# Patient Record
Sex: MALE | Race: WHITE | NOT HISPANIC OR LATINO | Employment: OTHER | URBAN - METROPOLITAN AREA
[De-identification: names, ages, dates, MRNs, and addresses within clinical notes are randomized per-mention and may not be internally consistent; named-entity substitution may affect disease eponyms.]

---

## 2018-01-30 ENCOUNTER — TELEPHONE (OUTPATIENT)
Dept: SURGERY | Facility: CLINIC | Age: 63
End: 2018-01-30

## 2018-01-30 NOTE — TELEPHONE ENCOUNTER
Spoke with pt and he stated he has all records and CD's. Pt wanted a sooner appt because he feels he needs to be seen asap due to rapid health decline. Appt made for tomorrow w/Dr. Valdes.

## 2018-01-30 NOTE — TELEPHONE ENCOUNTER
----- Message from Navdeep Holley sent at 1/30/2018 10:10 AM CST -----  Contact: Pt:[phone]  Pt called to schedule an appointment with  for 02/08. Pt states he was referred to  by  in [place] . Pt states he has colon cancer and a huge tumor that is causing issues and he wanted to get in as soon as possible. I did place pt on wait list.

## 2018-01-31 ENCOUNTER — OFFICE VISIT (OUTPATIENT)
Dept: SURGERY | Facility: CLINIC | Age: 63
End: 2018-01-31
Payer: MEDICARE

## 2018-01-31 VITALS — HEART RATE: 72 BPM | WEIGHT: 187.19 LBS | SYSTOLIC BLOOD PRESSURE: 161 MMHG | DIASTOLIC BLOOD PRESSURE: 93 MMHG

## 2018-01-31 DIAGNOSIS — C18.6 CANCER OF DESCENDING COLON METASTATIC TO INTRA-ABDOMINAL LYMPH NODE: ICD-10-CM

## 2018-01-31 DIAGNOSIS — D49.0 COLORECTAL NEOPLASM: ICD-10-CM

## 2018-01-31 DIAGNOSIS — C77.2 CANCER OF DESCENDING COLON METASTATIC TO INTRA-ABDOMINAL LYMPH NODE: ICD-10-CM

## 2018-01-31 DIAGNOSIS — C18.7 MALIGNANT NEOPLASM OF SIGMOID COLON: ICD-10-CM

## 2018-01-31 PROCEDURE — 99999 PR PBB SHADOW E&M-EST. PATIENT-LVL III: CPT | Mod: PBBFAC,,, | Performed by: COLON & RECTAL SURGERY

## 2018-01-31 PROCEDURE — 99205 OFFICE O/P NEW HI 60 MIN: CPT | Mod: S$PBB,,, | Performed by: COLON & RECTAL SURGERY

## 2018-01-31 PROCEDURE — 99213 OFFICE O/P EST LOW 20 MIN: CPT | Mod: PBBFAC | Performed by: COLON & RECTAL SURGERY

## 2018-01-31 RX ORDER — LISINOPRIL 20 MG/1
20 TABLET ORAL NIGHTLY
COMMUNITY

## 2018-01-31 NOTE — PROGRESS NOTES
Patient ID:  Valdemar Armstrong is a 62 y.o. male     Chief Complaint:   Chief Complaint   Patient presents with    Colon Cancer        HPI: In 2013 had obstructing sigmoid cancer. HAd resection and colostomy followed by 8 months of chemotherapy. Subsequently had colostomy takedown.   In Feb 2017 had a recurrence  That led to colonic stent followed by a small bowel obstruction and a subsequent small bowel bypass with low volume small bowel leak that has essentially resolved.    Has moved from [place] to [facility] to live with relative. Is scheduled forPETCT here at [facility] and wishes to transfer care to [facility].    ROS:        Constitutional: No fever, chills, activity or appetite change.      HENT: No hearing loss, facial swelling, neck pain or stiffness.       Eyes: No discharge, itching and visual disturbance.      Respiratory: No apnea, cough, choking or shortness of breath.       Cardiovascular: No leg swelling or chest pain      Gastrointestinal: No abdominal distention or change in bowel habbits     Genitourinary: No dysuria, frequency or flank pain.      Musculoskeletal: No arthralgias or gait problem.      Neurological: No dizziness, seizures or weakness.      Hematological: No adenopathy.      Psychiatric/Behavioral: No hallucinations or behavioral problems.       PE:    APPEARANCE: Well nourished, well developed, in no acute distress.   CHEST: Lungs clear. Normal respiratory effort.  CARDIOVASCULAR: Normal rhythm. No edema.  ABDOMEN: Soft. Midline incision with small 2 mm wound without drainage    Rectum:  Normal skin, NST, no masses or tenderness    Musculoskeletal: Symmetric, normal range of motion and strength.   Neurological: Alert and oriented to person, place, and time. Normal reflexes.   Skin: Skin is warm and dry.   Psychiatric: Normal mood and affect. Behavior is normal and appropriate.     Impression: Recurrent colon cancer    Encounter Diagnoses   Name Primary?    Colorectal neoplasm      Malignant neoplasm of sigmoid colon         PLAN: PETCT. Will review records and scan then discuss with Oncology. Will call patient with results and recommendations.

## 2018-02-05 ENCOUNTER — TELEPHONE (OUTPATIENT)
Dept: SURGERY | Facility: CLINIC | Age: 63
End: 2018-02-05

## 2018-02-05 NOTE — TELEPHONE ENCOUNTER
----- Message from Kori Méndez sent at 2/5/2018  9:14 AM CST -----  Contact: [phone] -self   Lucio - calling to schedule surgery - states needs note from dr burdick to join the [facility] for 30 days free -  please call patient at

## 2018-02-05 NOTE — TELEPHONE ENCOUNTER
Spoke with pt and he wanted an appt for after his PET scan. I informed him that Dr. Valdes will call him with the results.

## 2018-02-06 ENCOUNTER — TELEPHONE (OUTPATIENT)
Dept: SPORTS MEDICINE | Facility: CLINIC | Age: 63
End: 2018-02-06

## 2018-02-06 DIAGNOSIS — R53.81 PHYSICAL DECONDITIONING: Primary | ICD-10-CM

## 2018-02-08 ENCOUNTER — HOSPITAL ENCOUNTER (OUTPATIENT)
Dept: RADIOLOGY | Facility: HOSPITAL | Age: 63
Discharge: HOME | End: 2018-02-08
Attending: COLON & RECTAL SURGERY
Payer: MEDICARE

## 2018-02-08 DIAGNOSIS — D49.0 COLORECTAL NEOPLASM: ICD-10-CM

## 2018-02-08 DIAGNOSIS — C18.7 MALIGNANT NEOPLASM OF SIGMOID COLON: ICD-10-CM

## 2018-02-08 LAB — POCT GLUCOSE: 104 MG/DL (ref 70–110)

## 2018-02-08 PROCEDURE — 78815 PET IMAGE W/CT SKULL-THIGH: CPT | Mod: TC

## 2018-02-08 PROCEDURE — 78815 PET IMAGE W/CT SKULL-THIGH: CPT | Mod: 26,PS,, | Performed by: RADIOLOGY

## 2018-02-08 PROCEDURE — A9552 F18 FDG: HCPCS

## 2018-02-09 ENCOUNTER — TELEPHONE (OUTPATIENT)
Dept: SURGERY | Facility: CLINIC | Age: 63
End: 2018-02-09

## 2018-02-09 NOTE — TELEPHONE ENCOUNTER
Spoke with patient regarding his results per Dr. Valdes. He would like Dr. Valdes to confer with his doctor in [place] regarding the liver mets.

## 2018-02-09 NOTE — TELEPHONE ENCOUNTER
----- Message from Navdeep Holley sent at 2/9/2018  8:59 AM CST -----  Contact: Pt:[phone]  Pt called and states she would like to speak with the nurse to go over his test results. Pt states he would like to know if there going to do surgery.

## 2018-02-12 ENCOUNTER — TELEPHONE (OUTPATIENT)
Dept: SURGERY | Facility: CLINIC | Age: 63
End: 2018-02-12

## 2018-02-12 ENCOUNTER — PATIENT MESSAGE (OUTPATIENT)
Dept: SURGERY | Facility: CLINIC | Age: 63
End: 2018-02-12

## 2018-02-12 NOTE — TELEPHONE ENCOUNTER
----- Message from Navdeep Holley sent at 2/12/2018 12:14 PM CST -----  Contact: Pt:[phone]  Pt called and states he would like to have his script for his chemo sent to his doctor in [place].Pt states if he needs to see  before leaving which he states he is leaving on Friday 02/16. Pt states the nurse can give him a call at the number above.        Pt Doctor  Ph:[phone]  Fax:[phone]

## 2018-02-14 ENCOUNTER — OFFICE VISIT (OUTPATIENT)
Dept: SURGERY | Facility: CLINIC | Age: 63
End: 2018-02-14
Payer: MEDICARE

## 2018-02-14 VITALS — SYSTOLIC BLOOD PRESSURE: 122 MMHG | WEIGHT: 179 LBS | HEART RATE: 76 BPM | DIASTOLIC BLOOD PRESSURE: 81 MMHG

## 2018-02-14 DIAGNOSIS — C18.6 CANCER OF DESCENDING COLON METASTATIC TO INTRA-ABDOMINAL LYMPH NODE: Primary | ICD-10-CM

## 2018-02-14 DIAGNOSIS — C77.2 CANCER OF DESCENDING COLON METASTATIC TO INTRA-ABDOMINAL LYMPH NODE: Primary | ICD-10-CM

## 2018-02-14 PROCEDURE — 99999 PR PBB SHADOW E&M-EST. PATIENT-LVL II: CPT | Mod: PBBFAC,,, | Performed by: COLON & RECTAL SURGERY

## 2018-02-14 PROCEDURE — 99212 OFFICE O/P EST SF 10 MIN: CPT | Mod: PBBFAC | Performed by: COLON & RECTAL SURGERY

## 2018-02-14 PROCEDURE — 99214 OFFICE O/P EST MOD 30 MIN: CPT | Mod: S$PBB,,, | Performed by: COLON & RECTAL SURGERY

## 2018-02-14 NOTE — PROGRESS NOTES
Patient ID:  Valdemar Armstrong is a 62 y.o. male     Chief Complaint:   Chief Complaint   Patient presents with    Follow-up        HPI: PET Scan: dome of the liver anteriorly there are 2 hypermetabolic metastatic foci SUV max 5.81. There is a stent crossing a neoplastic stricture of the sigmoid colon SUV max 7.48. There is inflammatory activity from postsurgical change of the anterior abdomen. There is left renal atrophy and hydronephrosis.    CAse discussed at MDT recommended preop chemo then consideration of resection     In 2013 had obstructing sigmoid cancer. HAd resection and colostomy followed by 8 months of chemotherapy. Subsequently had colostomy takedown.   In Feb 2017 had a recurrence  That led to colonic stent followed by a small bowel obstruction and a subsequent small bowel bypass with low volume small bowel leak that has essentially resolved.    Has moved from [place] to [facility] to live with relative. Is scheduled forPETCT here at [facility] and wishes to transfer care to [facility].    ROS:        Constitutional: No fever, chills, activity or appetite change.      HENT: No hearing loss, facial swelling, neck pain or stiffness.       Eyes: No discharge, itching and visual disturbance.      Respiratory: No apnea, cough, choking or shortness of breath.       Cardiovascular: No leg swelling or chest pain      Gastrointestinal: No abdominal distention or change in bowel habbits     Genitourinary: No dysuria, frequency or flank pain.      Musculoskeletal: No arthralgias or gait problem.      Neurological: No dizziness, seizures or weakness.      Hematological: No adenopathy.      Psychiatric/Behavioral: No hallucinations or behavioral problems.       PE:    APPEARANCE: Well nourished, well developed, in no acute distress.   CHEST: Lungs clear. Normal respiratory effort.  CARDIOVASCULAR: Normal rhythm. No edema.  ABDOMEN: Soft. Midline incision with small 2 mm wound without drainage    Rectum:  Normal skin,  NST, no masses or tenderness    Musculoskeletal: Symmetric, normal range of motion and strength.   Neurological: Alert and oriented to person, place, and time. Normal reflexes.   Skin: Skin is warm and dry.   Psychiatric: Normal mood and affect. Behavior is normal and appropriate.     Impression: Recurrent colon cancer, liver met        PLAN: Chemotherapy then considertion of resection. Patient desires to get chemo in [place].

## 2018-02-20 ENCOUNTER — PATIENT MESSAGE (OUTPATIENT)
Dept: SURGERY | Facility: CLINIC | Age: 63
End: 2018-02-20

## 2018-04-01 ENCOUNTER — PATIENT MESSAGE (OUTPATIENT)
Dept: SURGERY | Facility: CLINIC | Age: 63
End: 2018-04-01

## 2018-04-19 ENCOUNTER — PATIENT MESSAGE (OUTPATIENT)
Dept: SURGERY | Facility: CLINIC | Age: 63
End: 2018-04-19

## 2018-04-20 ENCOUNTER — TELEPHONE (OUTPATIENT)
Dept: SURGERY | Facility: CLINIC | Age: 63
End: 2018-04-20

## 2018-04-20 NOTE — TELEPHONE ENCOUNTER
----- Message from Elizabeth Madrid sent at 4/20/2018  3:24 PM CDT -----  Contact: Self- [phone]  Lucio- pt states he is returning a missed call from Dr. Valdes- please call pt back at [phone]

## 2018-05-02 ENCOUNTER — PATIENT MESSAGE (OUTPATIENT)
Dept: SURGERY | Facility: CLINIC | Age: 63
End: 2018-05-02

## 2018-05-06 ENCOUNTER — PATIENT MESSAGE (OUTPATIENT)
Dept: SURGERY | Facility: CLINIC | Age: 63
End: 2018-05-06

## 2018-05-11 ENCOUNTER — TELEPHONE (OUTPATIENT)
Dept: SURGERY | Facility: CLINIC | Age: 63
End: 2018-05-11

## 2018-05-11 NOTE — TELEPHONE ENCOUNTER
Spoke with patient and he informed me that he had not heard back from Dr. Valdes in regards to meeting about his care.  I will talk to him today.

## 2018-05-14 ENCOUNTER — PATIENT MESSAGE (OUTPATIENT)
Dept: SURGERY | Facility: CLINIC | Age: 63
End: 2018-05-14

## 2018-05-21 ENCOUNTER — TELEPHONE (OUTPATIENT)
Dept: SURGERY | Facility: CLINIC | Age: 63
End: 2018-05-21

## 2018-05-21 NOTE — TELEPHONE ENCOUNTER
Spoke with pt and he had questions about who would be doing other surgery that Dr. Valdes talked to him about. Informed pt Dr. Valdes was out of the office until Wednesday and I will send him a message and get back with him.

## 2018-05-21 NOTE — TELEPHONE ENCOUNTER
----- Message from Kori Méndez sent at 5/21/2018  9:42 AM CDT -----  Contact: self - [phone]  Lucio - has questions re his surgeries - please call patient at

## 2018-06-07 DIAGNOSIS — D49.0 COLORECTAL NEOPLASM: ICD-10-CM

## 2018-06-08 DIAGNOSIS — C77.2 CANCER OF DESCENDING COLON METASTATIC TO INTRA-ABDOMINAL LYMPH NODE: Primary | ICD-10-CM

## 2018-06-08 DIAGNOSIS — C18.6 CANCER OF DESCENDING COLON METASTATIC TO INTRA-ABDOMINAL LYMPH NODE: Primary | ICD-10-CM

## 2018-06-12 ENCOUNTER — ANESTHESIA EVENT (OUTPATIENT)
Dept: SURGERY | Facility: HOSPITAL | Age: 63
End: 2018-06-12

## 2018-06-12 DIAGNOSIS — Z01.818 PREOP TESTING: Primary | ICD-10-CM

## 2018-06-12 NOTE — ANESTHESIA PREPROCEDURE EVALUATION
Anesthesia Assessment: Preoperative EQUATION    Planned Procedure: Procedure(s) (LRB):  RESECTION, COLON, LOW ANTERIOR (N/A)  CHEMOTHERAPY, INTRAPERITONEAL, HYPERTHERMIC (N/A)  Requested Anesthesia Type:General  Surgeon: Jam Mancia MD  Service: Colon and Rectal  Known or anticipated Date of Surgery:6/26/2018    Surgeon notes: reviewed and Cancer of descending colon metastatic to intra-abdominal lymph node    Previous anesthesia records:Not available and Patient has had several [facility] at [facility]. No anesthesia records found in docplanner system.    Last PCP note: No PCP at this time  Tests already available:  No recent tests.      Plan:    Testing:  Hematology Profile, CMP and T&S   Pre-anesthesia  visit       Visit focus: concerns in complex and/or prolonged anesthesia, PAT [facility]     Consultation:IM Perioperative Hospitalist PTA [facility]     Patient  has previously scheduled Medical Appointment:Appointmenta on 6/14/18 & 6/18/18, prior to surgery date.    Navigation: Tests Scheduled. Labs-Hem Prof, CMP, T&S, EKG on 6/18/18 @ 9:20a & 9:45a             Consults scheduled.POC & Perioperative IM Consult on 6/18/18 @ 11a & 1p             Results will be tracked by Preop Clinic.                 Dawn Garcia RN  6/12/18 06/12/2018  Valdemar Armstrong is a 62 y.o., male.    Pre-op Assessment      I have reviewed the Medications.   Steroids Taken In Past Year:     Review of Systems  Anesthesia Hx:  History of prior surgery of interest to airway management or planning: Previous anesthesia: General 1/2018: Colon resection with general anesthesia.  Denies Family Hx of Anesthesia complications.  Personal Hx of Anesthesia complications  Cardiac Arrest (in 1980s; after brain tumor surgery due to possible excessive sedation; had multiple surgeries since then without incident.)   Social:  Denies Tobacco  Use. Denies Alcohol Use.   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  Hypertension    Pulmonary:   bilateral lung nodules Denies Asthma.  Denies Chronic Obstructive Pulmonary Disease (COPD).  Possible Obstructive Sleep Apnea , (STOP/BANG) Symptoms S - Snoring (loud), P - Pressure being treated for high BP, A - Age > 50 and G - Gender (Male > Female)    Renal/:   renal impairment Kidney Function/Disease, Single Kidney  Other Renal / Gu Conditions: Hydronephrosis  Hepatic/GI:   Sigmoid stent left in place Denies Esophageal / Stomach Disorders  Bowel Conditions:  Bowel Obstruction, Small Bowel Obstruction (11/2017)  Liver Disease Liver cancer: S/P chemo; completed 5/24/18   Neurological:   Peripheral Neuropathy  Denies CVA - Cerebrovasular Accident  Denies TIA - Transient Ischemic Attack    Endocrine:  Metabolic Disorders, Obesity / BMI > 30  Psych:  Anxiety Disorder.  Depression.          Physical Exam  General:  Obesity    Airway/Jaw/Neck:  Airway Findings: Mouth Opening: Normal Jaw/Neck Findings:  Neck ROM: Decreased Lateral Motion, to the right  Neck Findings:  Girth Increased  Fullness noted to anterior neck       Dental:  Dental Findings:        Mental Status:  Mental Status Findings:  Cooperative, Alert and Oriented          The patient was seen by Perioperative Internal Medicine physician Dr. Li on 6/18/18 , please see recommendations.        Lea George RN

## 2018-06-14 ENCOUNTER — INITIAL CONSULT (OUTPATIENT)
Dept: SURGERY | Facility: CLINIC | Age: 63
End: 2018-06-14
Payer: MEDICARE

## 2018-06-14 ENCOUNTER — HOSPITAL ENCOUNTER (OUTPATIENT)
Dept: RADIOLOGY | Facility: HOSPITAL | Age: 63
Discharge: HOME | End: 2018-06-14
Attending: COLON & RECTAL SURGERY
Payer: MEDICARE

## 2018-06-14 VITALS
TEMPERATURE: 97 F | HEIGHT: 70 IN | DIASTOLIC BLOOD PRESSURE: 96 MMHG | SYSTOLIC BLOOD PRESSURE: 122 MMHG | WEIGHT: 222 LBS | HEART RATE: 66 BPM | BODY MASS INDEX: 31.78 KG/M2

## 2018-06-14 DIAGNOSIS — D49.0 COLORECTAL NEOPLASM: ICD-10-CM

## 2018-06-14 DIAGNOSIS — C18.9 METASTATIC COLON CANCER TO LIVER: ICD-10-CM

## 2018-06-14 DIAGNOSIS — C78.7 METASTATIC COLON CANCER TO LIVER: ICD-10-CM

## 2018-06-14 DIAGNOSIS — C18.7 MALIGNANT NEOPLASM OF SIGMOID COLON: Primary | ICD-10-CM

## 2018-06-14 DIAGNOSIS — R91.8 MULTIPLE PULMONARY NODULES DETERMINED BY COMPUTED TOMOGRAPHY OF LUNG: ICD-10-CM

## 2018-06-14 LAB
CREAT SERPL-MCNC: 1.1 MG/DL (ref 0.5–1.4)
SAMPLE: NORMAL

## 2018-06-14 PROCEDURE — 99205 OFFICE O/P NEW HI 60 MIN: CPT | Mod: S$PBB,,, | Performed by: NURSE PRACTITIONER

## 2018-06-14 PROCEDURE — 74177 CT ABD & PELVIS W/CONTRAST: CPT | Mod: 26,,, | Performed by: RADIOLOGY

## 2018-06-14 PROCEDURE — 71260 CT THORAX DX C+: CPT | Mod: 26,,, | Performed by: RADIOLOGY

## 2018-06-14 PROCEDURE — 99999 PR PBB SHADOW E&M-EST. PATIENT-LVL III: CPT | Mod: PBBFAC,,, | Performed by: NURSE PRACTITIONER

## 2018-06-14 PROCEDURE — 25500020 PHARM REV CODE 255: Performed by: COLON & RECTAL SURGERY

## 2018-06-14 PROCEDURE — 74177 CT ABD & PELVIS W/CONTRAST: CPT | Mod: TC

## 2018-06-14 PROCEDURE — 99213 OFFICE O/P EST LOW 20 MIN: CPT | Mod: PBBFAC,25 | Performed by: NURSE PRACTITIONER

## 2018-06-14 RX ORDER — ALPRAZOLAM 0.5 MG/1
TABLET ORAL
COMMUNITY
End: 2018-06-22 | Stop reason: SDUPTHER

## 2018-06-14 RX ORDER — LISINOPRIL 20 MG/1
TABLET ORAL
COMMUNITY
End: 2018-06-14

## 2018-06-14 RX ADMIN — IOHEXOL 15 ML: 350 INJECTION, SOLUTION INTRAVENOUS at 08:06

## 2018-06-14 RX ADMIN — IOHEXOL 100 ML: 350 INJECTION, SOLUTION INTRAVENOUS at 08:06

## 2018-06-14 RX ADMIN — IOHEXOL 15 ML: 350 INJECTION, SOLUTION INTRAVENOUS at 07:06

## 2018-06-14 NOTE — PROGRESS NOTES
"History & Physical    SUBJECTIVE:     History of Present Illness:  Mr. Armstrong has a complicated PMX with multiple cancers, including hx of lymphoma (1980's), throat cancer (1980's), skin cancers (2014), and colon cancer (2013).   He presents today concerning recurrent colon cancer with metastasis to the liver. He is seeing CRS and urology next week.     In Feb 2013 he had a perforated colon cancer and underwent surgery which required a colostomy.  He was treated with 8 months of chemo and then had his ostomy reversed in Oct 2013. In June 2017 he had a recurrence which lead to a colon stent being placed.  He developed a SBO in Nov 2017 and underwent a small bowel resection which was complicated by a wound infection requiring a wound vac.  In Dec 2017 a CT chest showed some new sub cm lung nodules.  On a PET scan there was a liver lesion and the lesion in the colon, no other disease seen. He was then treated with chemo again in March and finished 5/24/18.     He tolerated it well except significant neuropathy which remains.  He is eating well and energy is good and improving.     CT today:  Images personally viewed by Dr. Baez.   "1. In this patient with colon cancer, there is a soft tissue mass arising from the sigmoid colon which abuts the left common and internal iliac arteries and occludes the left distal ureter.  Faint hypodensity in the dome of the liver corresponding to an area of hypermetabolism on recent PET-CT consistent with metastatic disease.  2. Occlusion of the right subclavian artery near its bifurcation with distal reconstitution and irregular atherosclerotic plaque throughout its course.  3. Multiple bilateral pulmonary nodules measuring up to 0.6 cm, stable when compared to prior study dated 02/08/2018.  4. Severe left hydroureteronephrosis with cortical thinning of the left kidney.  5. Postsurgical changes of partial colectomy.  Sigmoid stent in place.  6. Multiple stable hepatic hypodensities " "with attenuation consistent with simple cysts noting that these did not demonstrate hypermetabolism on prior PET-CT.  7. Additional findings as above."      Review of patient's allergies indicates:  No Known Allergies    Current Outpatient Prescriptions   Medication Sig Dispense Refill    ALPRAZolam (XANAX) 0.5 MG tablet alprazolam 0.5 mg tablet   Take 1 tablet 3 times a day by oral route as needed.      lisinopril (PRINIVIL,ZESTRIL) 20 MG tablet Take 20 mg by mouth once daily.       No current facility-administered medications for this visit.        Past Medical History:   Diagnosis Date    Cancer 2015    Colon, Stage IV     Past Surgical History:   Procedure Laterality Date    COLON SURGERY  2015    SMALL INTESTINE SURGERY  2017     No family history on file.  Social History   Substance Use Topics    Smoking status: Never Smoker    Smokeless tobacco: Never Used    Alcohol use No        Review of Systems:  Review of Systems   Constitutional: Positive for fatigue.   HENT: Negative.    Respiratory: Negative.    Cardiovascular: Negative.    Gastrointestinal:        Some discomfort associated with the stent.    Genitourinary: Negative.    Musculoskeletal: Negative.    Skin: Negative.    Neurological:        Neuropathy    Psychiatric/Behavioral: Negative.        OBJECTIVE:     Vital Signs (Most Recent)  Temp: 97.2 °F (36.2 °C) (06/14/18 1009)  Pulse: 66 (06/14/18 1009)  BP: (!) 122/96 (06/14/18 1009)  5' 10" (1.778 m)  100.7 kg (222 lb 0.1 oz)     Physical Exam:  Physical Exam   Constitutional: He is oriented to person, place, and time. He appears well-developed and well-nourished.   HENT:   Head: Normocephalic and atraumatic.   Eyes: Conjunctivae are normal.   Neck: Normal range of motion. Neck supple.   Cardiovascular: Normal rate, regular rhythm and normal heart sounds.    Pulmonary/Chest: Effort normal and breath sounds normal.   Abdominal: Soft. Bowel sounds are normal.   Prior midline lower abd incision " noted   Musculoskeletal: Normal range of motion. He exhibits no edema.   Neurological: He is alert and oriented to person, place, and time.   Skin: Skin is warm and dry.   Psychiatric: He has a normal mood and affect.       ASSESSMENT/PLAN:     Recurrent colon cancer in the sigmoid colon.  Single small metastatic lesion in segment VIII  Multiple sub cm pulmonary nodules of uncertain etiology.     PLAN:  If CRS decides to proceed with complete resection of the recurrent colon cancer with curative intent, Dr Baez would be available to perform a wedge resection of the known liver metastasis.    Dr. Baez was available to personally meet and evaluate him today and after a detailed discussion regarding the details of surgery, expected PO course and risks, and after all of this questions were answered to his satisfaction , informed consent was obtained.

## 2018-06-14 NOTE — LETTER
June 14, 2018      Jam Mancia MD  [address] [facility] [address]           [facility]  [address]  [facility] [id number]  Phone: [phone]          Patient: Valdemar Armstrong   MR Number: [id number]   YOB: 1955   Date of Visit: 6/14/2018       Dear Dr. Jam Mancia:    Thank you for referring Valdemar Armstrong to Dr. Baez for evaluation. Attached you will find relevant portions of my assessment and plan of care.    If you have questions, please do not hesitate to call me. I look forward to following Valdemar Armstrong along with you.    Sincerely,    Molly Flores, NP    Enclosure  CC:  No Recipients    If you would like to receive this communication electronically, please contact [email or web address] or [phone] to request more information on [employer] Link access.    For providers and/or their staff who would like to refer a patient to [facility], please contact us through our one-stop-shop provider referral line, [facility] , at [phone].    If you feel you have received this communication in error or would no longer like to receive these types of communications, please e-mail [email or web address]

## 2018-06-18 ENCOUNTER — HOSPITAL ENCOUNTER (OUTPATIENT)
Dept: CARDIOLOGY | Facility: CLINIC | Age: 63
Discharge: HOME | End: 2018-06-18
Payer: MEDICARE

## 2018-06-18 ENCOUNTER — TELEPHONE (OUTPATIENT)
Dept: HEMATOLOGY/ONCOLOGY | Facility: CLINIC | Age: 63
End: 2018-06-18

## 2018-06-18 ENCOUNTER — INITIAL CONSULT (OUTPATIENT)
Dept: INTERNAL MEDICINE | Facility: CLINIC | Age: 63
End: 2018-06-18
Payer: MEDICARE

## 2018-06-18 ENCOUNTER — TELEPHONE (OUTPATIENT)
Dept: SURGERY | Facility: CLINIC | Age: 63
End: 2018-06-18

## 2018-06-18 ENCOUNTER — HOSPITAL ENCOUNTER (OUTPATIENT)
Dept: PREADMISSION TESTING | Facility: HOSPITAL | Age: 63
Discharge: HOME | End: 2018-06-18
Attending: ANESTHESIOLOGY
Payer: MEDICARE

## 2018-06-18 ENCOUNTER — OFFICE VISIT (OUTPATIENT)
Dept: SURGERY | Facility: CLINIC | Age: 63
End: 2018-06-18
Payer: MEDICARE

## 2018-06-18 VITALS
BODY MASS INDEX: 30.53 KG/M2 | HEIGHT: 71 IN | DIASTOLIC BLOOD PRESSURE: 61 MMHG | HEART RATE: 73 BPM | WEIGHT: 218.06 LBS | SYSTOLIC BLOOD PRESSURE: 91 MMHG

## 2018-06-18 VITALS
HEART RATE: 74 BPM | TEMPERATURE: 98 F | OXYGEN SATURATION: 97 % | BODY MASS INDEX: 31.21 KG/M2 | DIASTOLIC BLOOD PRESSURE: 75 MMHG | SYSTOLIC BLOOD PRESSURE: 94 MMHG | WEIGHT: 218 LBS | HEIGHT: 70 IN

## 2018-06-18 DIAGNOSIS — N18.30 CKD (CHRONIC KIDNEY DISEASE) STAGE 3, GFR 30-59 ML/MIN: ICD-10-CM

## 2018-06-18 DIAGNOSIS — C18.9 METASTATIC COLON CANCER TO LIVER: ICD-10-CM

## 2018-06-18 DIAGNOSIS — I10 ESSENTIAL HYPERTENSION: ICD-10-CM

## 2018-06-18 DIAGNOSIS — Z92.89 HISTORY OF SUCCESSFUL CARDIOPULMONARY RESUSCITATION: ICD-10-CM

## 2018-06-18 DIAGNOSIS — C18.9 COLON CANCER METASTASIZED TO LIVER: Primary | ICD-10-CM

## 2018-06-18 DIAGNOSIS — R91.8 MULTIPLE PULMONARY NODULES DETERMINED BY COMPUTED TOMOGRAPHY OF LUNG: ICD-10-CM

## 2018-06-18 DIAGNOSIS — C78.7 METASTATIC COLON CANCER TO LIVER: ICD-10-CM

## 2018-06-18 DIAGNOSIS — R06.83 SNORING: ICD-10-CM

## 2018-06-18 DIAGNOSIS — E66.9 CLASS 1 OBESITY WITH BODY MASS INDEX (BMI) OF 31.0 TO 31.9 IN ADULT, UNSPECIFIED OBESITY TYPE, UNSPECIFIED WHETHER SERIOUS COMORBIDITY PRESENT: ICD-10-CM

## 2018-06-18 DIAGNOSIS — R07.89 ATYPICAL CHEST PAIN: ICD-10-CM

## 2018-06-18 DIAGNOSIS — C14.0 THROAT CANCER: ICD-10-CM

## 2018-06-18 DIAGNOSIS — C78.7 COLON CANCER METASTASIZED TO LIVER: Primary | ICD-10-CM

## 2018-06-18 DIAGNOSIS — Z01.818 PREOP TESTING: ICD-10-CM

## 2018-06-18 DIAGNOSIS — Z01.818 PREOP EXAMINATION: Primary | ICD-10-CM

## 2018-06-18 DIAGNOSIS — I73.9 PERIPHERAL VASCULAR DISEASE: ICD-10-CM

## 2018-06-18 PROBLEM — N28.9 RENAL IMPAIRMENT: Status: ACTIVE | Noted: 2018-06-18

## 2018-06-18 PROCEDURE — 99215 OFFICE O/P EST HI 40 MIN: CPT | Mod: S$PBB,,, | Performed by: COLON & RECTAL SURGERY

## 2018-06-18 PROCEDURE — 99212 OFFICE O/P EST SF 10 MIN: CPT | Mod: PBBFAC,27,25 | Performed by: COLON & RECTAL SURGERY

## 2018-06-18 PROCEDURE — 99213 OFFICE O/P EST LOW 20 MIN: CPT | Mod: PBBFAC,25 | Performed by: HOSPITALIST

## 2018-06-18 PROCEDURE — 99999 PR PBB SHADOW E&M-EST. PATIENT-LVL II: CPT | Mod: PBBFAC,,, | Performed by: COLON & RECTAL SURGERY

## 2018-06-18 PROCEDURE — 93005 ELECTROCARDIOGRAM TRACING: CPT | Mod: PBBFAC | Performed by: INTERNAL MEDICINE

## 2018-06-18 PROCEDURE — 99204 OFFICE O/P NEW MOD 45 MIN: CPT | Mod: S$PBB,,, | Performed by: HOSPITALIST

## 2018-06-18 PROCEDURE — 99999 PR PBB SHADOW E&M-EST. PATIENT-LVL III: CPT | Mod: PBBFAC,,, | Performed by: HOSPITALIST

## 2018-06-18 PROCEDURE — 93010 ELECTROCARDIOGRAM REPORT: CPT | Mod: S$PBB,,, | Performed by: INTERNAL MEDICINE

## 2018-06-18 RX ORDER — ALPRAZOLAM 0.5 MG/1
0.5 TABLET ORAL 3 TIMES DAILY
Qty: 90 TABLET | Refills: 0 | Status: SHIPPED | OUTPATIENT
Start: 2018-06-18 | End: 2018-07-18

## 2018-06-18 NOTE — ASSESSMENT & PLAN NOTE
Possible sleep apnea- I suggested a sleep study and suggest caution with usage of medication that can cause respiratory suppression in the perioperative period  potential ramifications of untreated sleep apnea, which could include daytime sleepiness, hypertension, heart disease and stroke were discussed  avoidance supine sleep, weight gain and alcoholic beverages since all of these can worsen FLORENCIA     Suggest Care with opioid treatment as, he had problems in the past on 2 sep[rated occasions

## 2018-06-18 NOTE — TELEPHONE ENCOUNTER
----- Message from Navdeep Holley sent at 6/18/2018  4:27 PM CDT -----  Contact: Pt:[phone]  .Needs Advice    Reason for call: Pt states he was told to call the office back had cardiovascular not called him. Pt states cardiovascular did not call him.     Communication Preference:Pt telephone  Additional Information:

## 2018-06-18 NOTE — HPI
History of present illness- I had the pleasure of meeting this pleasant 62 y.o. lady in the pre op clinic prior to his elective Abdominal surgery. The patient is new to me . Valdemar was accompanied by sister Parul.    I have obtained the history by speaking to the patient and by reviewing the electronic health records.    Events leading up to surgery / History of presenting illness -    Cancer of descending colon metastatic to intra-abdominal lymph node        Relevant health conditions of significance for the perioperative period/ History of presenting illness -        Patient Active Problem List    Diagnosis Date Noted    Essential hypertension 06/18/2018     Lisinopril nightly   Checks BP at the Gym -150-170/ 104- 87       Peripheral vascular disease-CT from June 2018 -Occlusion of the right subclavian artery near its bifurcation 06/18/2018     CT from June 2018   Occlusion of the right subclavian artery near its bifurcation with distal reconstitution and irregular atherosclerotic plaque throughout its course.  Records low BP on the Rt upper extremity       Class 1 obesity in adult 06/18/2018     Gained about 40 pounds with steroids during chemo , reduced physical activity from feeling unwell with chemo   Not known to have heart disease , Diabetes Mellitus,fatty liver        Renal impairment 06/18/2018     Ct from June 2018     The right kidney is normal in size and location.  There is severe left hydroureteronephrosis with cortical thinning of the left kidney      Throat cancer 06/18/2018     1983   Had surgery , radiation   1 year later had to have axillary lymph glad surgery from cancer   No dysphagia      History of successful cardiopulmonary resuscitation 06/18/2018 1980's with skull surgery   To his understanding related to excessive sedation   Prone position surgery       Snoring 06/18/2018    Atypical chest pain 06/18/2018     Reproducible , by the shoulder       Metastatic colon cancer to  liver 06/14/2018     In Feb 2013 he had a perforated colon cancer and underwent surgery which required a colostomy.    He was treated with 8 months of chemo and then had his ostomy reversed in Oct 2013 ( Had a lengthy surgery of 18 hours )     In 2014 - had multiple skin cancers , he attributes to cancer chemo therapy      In June 2017 he had a recurrence which lead to a colon stent being placed.      He developed a SBO in Nov 2017 ( to his understanding , mass effect of the tumor ) and underwent a small bowel resection which was complicated by a wound infection requiring a wound vac.      In Dec 2017 a CT chest showed some new sub cm lung nodules.  On a PET scan there was a liver lesion and the lesion in the colon, no other disease seen. He was then treated with chemo again in March and finished 5/24/18      Multiple pulmonary nodules determined by computed tomography of lung 06/14/2018     Unknown etiology as of 6/14/18.     CT June 2018 -Multiple bilateral pulmonary nodules measuring up to 0.6 cm, stable when compared to prior study dated 02/08/2018.      Cancer of descending colon metastatic to intra-abdominal lymph node 01/31/2018

## 2018-06-18 NOTE — TELEPHONE ENCOUNTER
Spoke to Kori, let her know that Dr. Carroll is out of the office till Monday. Let her know that I have sent the message to our nurse navigators to get scheduled. She verbalized an understanding

## 2018-06-18 NOTE — ASSESSMENT & PLAN NOTE
Creatinine -6/14/2018- 1.1     I  suggest monitoring renal function, in put and out put status mickey-operatively. I  suggest avoiding nephrotoxic medication including NSAIDs, COX2 inhibitors, intravenous contrast agent,avoiding hypotension to prevent further renal impairment.   Risk of Acute kidney injury discussed  NSAID effects discussed     Renal protection  Measures -Hydration , NSAID avoidance discussed  Care with high potassium diets suggested

## 2018-06-18 NOTE — TELEPHONE ENCOUNTER
----- Message from Sonny Carrillo sent at 6/18/2018  9:11 AM CDT -----  Contact: Dr. Mancia's office   Will like a call from Jeremy in regards to pt being seen this week     Ext::[phone]

## 2018-06-18 NOTE — ASSESSMENT & PLAN NOTE
Planned for Resection of colon , Liver lobectomy and possible intra peritoneal chemotherapy     CT from June showed-  soft tissue mass arising from the sigmoid colon which abuts the left common and internal iliac arteries   From what he describes , he is having vascular evaluation     Steroid treatment with chemo- I suggest monitoring the patient for any suggestions of adrenal insufficiency in view of the recent steroid use

## 2018-06-18 NOTE — ASSESSMENT & PLAN NOTE
Has Rt shoulder and by the chest discomfort for ling time   Reproducible by rotating the Rt shoulder , lifting weights, on bench press   Symptoms - not suggestive of Peripheral vascular disease

## 2018-06-18 NOTE — PROGRESS NOTES
Subjective:       Patient ID: Valdemar Armstrong is a 62 y.o. male.    Chief Complaint: Recurrent Colon Cancer    HPI Established pt.  62-year-old male with history of obstructing sigmoid cancer in 2013 who underwent resection which included a right colectomy and sigmoid colectomy and colostomy.  He subsequently had chemotherapy for 8 months and colostomy takedown in 2014.  February 2017 he had a recurrence found on colonoscopy which led to a colonic stent placement.  Subsequently developed a small-bowel obstruction and underwent a small-bowel resection in 2018 is also found to have possible pulmonary mets and hepatic Mets.  He is noted to have a nonfunctioning left kidney with hydro nephrosis.  PET-CT done in February 2018 showed sigmoid colon neoplasm with stent and to the liver Mets.  In April 2018 he had another PET scan which showed 2 presumed hepatic metastasis on the superior aspect of the right hepatic lobe with decreased intensity of FDG the uptake compared to prior study. He has been on chemotherapy until approximately 1 month ago.    CT scan from 14 June 2018:  Soft tissue mass arising in the sigmoid colon which abuts the left common and internal iliac arteries and includes left distal ureter.  Faint hypodensity in the dome of the liver corresponding with the areas of hypermetabolism on the PET-CT.  Multiple bilateral pulmonary nodules less than 6 mm in size and stable.  Multiple hepatic hypodensities consistent with cysts.    Past medical history:  History of throat cancer treated with surgery and radiation  Hypertension  Lymphoma in remission  Renal insufficiency  Recurrent nephrolithiasis    Review of Systems   Constitutional: Negative for chills and fever.   Respiratory: Negative for cough and shortness of breath.    Cardiovascular: Negative for chest pain and palpitations.   Gastrointestinal: Negative for nausea and vomiting.   Genitourinary: Negative for dysuria and urgency.   Neurological: Negative  for seizures and numbness.       Objective:      Physical Exam   Constitutional: He is oriented to person, place, and time. He appears well-developed and well-nourished.   Eyes: Conjunctivae and EOM are normal.   Pulmonary/Chest: Effort normal. No respiratory distress.   Abdominal: Soft. He exhibits no mass. There is no tenderness.   Musculoskeletal: Normal range of motion. He exhibits no edema.   Neurological: He is alert and oriented to person, place, and time.   Skin: Skin is warm and dry.   Psychiatric: He has a normal mood and affect. His behavior is normal.       Assessment:       1. Colon cancer metastasized to liver        Plan:       Locally recurrent colon cancer with evidence of metastasis to the liver which are potentially resectable for cure.  Today I discussed an operative plan with  which would include exploratory laparotomy with the intent of resecting his local recurrence.  This would potentially include left nephrectomy and it appears that he has involvement of his left iliac artery which may require partial resection.  I discussed that potential with Dr. Charmaine Hewitt from vascular today.  Additionally this procedure would most likely result in a permanent colostomy for Mr. new come although there is the potential for primary anastomosis with loop ileostomy.  The description of the procedure for his small-bowel obstruction in January was that of a difficult operative dissection. I explained that this would be an exploratory procedure and there were innumerable possibilities for complications and intraoperative findings.  He has several other appointments during the week to include an appointment with Urology and Medical Oncology.  I will discuss the case with him further later this week.

## 2018-06-18 NOTE — ASSESSMENT & PLAN NOTE
Hypertension-  Blood pressure is acceptable . I suggest continuation of Lisinopril ( nightly ) - during the entire perioperative period. I suggestblood pressure, electrolyte and renal function monitoring .I suggest addressing pain control as uncontrolled pain can increased blood pressure     Suggest going by BP reading on the Left upper extremity   Manual  / 88- LAINA

## 2018-06-18 NOTE — DISCHARGE INSTRUCTIONS
Your surgery has been scheduled for:__________________________________________    You should report to:  ____[facility], located on the [place] side of the first floor of the           [facility] ([phone])  ____[facility], located on the [facility] side of the            Second floor of the [facility] ([phone])  ____3rd Floor [facility] located on the [facility] side of the [facility] [phone]  Please Note   - Tell your doctor if you take Aspirin, products containing Aspirin, herbal medications  or blood thinners, such as Coumadin, Ticlid, or Plavix.  (Consult your provider regarding holding or stopping before surgery).  - Arrange for someone to drive you home following surgery.  You will not be allowed to leave the surgical facility alone or drive yourself home following sedation and anesthesia.  Before Surgery  - Stop taking all herbal medications 14days prior to surgery  - No Motrin/Advil (Ibuprofen) 7 days before surgery  - No Aleve (Naproxen) 7 days before surgery  - Stop Taking Asprin, products containing Asprin _____days before surgery  - Stop taking blood thinners_______days before surgery  - Refrain from drinking alcoholic beverages for 24hours before and after surgery  - Stop or limit smoking _________days before surgery  Night before Surgery  - DO NOT EAT OR DRINK ANYTHING AFTER MIDNIGHT, INCLUDING GUM, HARD CANDY, MINTS, OR CHEWING TOBACCO.  - Take a shower or bath (shower is recommended).  Bathe with Hibiclens soap or an antibacterial soap from the neck down.  If not supplied by your surgeon, hibiclens soap will need to be purchased over the counter in pharmacy.  Rinse soap off thoroughly.  - Shampoo your hair with your regular shampoo  The Day of Surgery  - Take another bath or shower with hibiclens or any antibacterial soap, to reduce the chance of infection.  - Take heart and blood  pressure medications with a small sip of water, as advised by the perioperative team.  - Do not take fluid pills  - You may brush your teeth and rinse your mouth, but do not swall any additional water.   - Do not apply perfumes, powder, body lotions or deodorant on the day of surgery.  - Nail polish should be removed.  - Do not wear makeup or moisturizer  - Wear comfortable clothes, such as a button front shirt and loose fitting pants.  - Leave all jewelry, including body piercings, and valuables at home.    - Bring any devices you will neeed after surgery such as crutches or canes.  - If you have sleep apnea, please bring your CPAP machine  In the event that your physical condition changes including the onset of a cold or respiratory illness, or if you have to delay or cancel your surgery, please notify your surgeon.  Anesthesia: General Anesthesia     You are watched continuously during your procedure by your anesthesia provider.     Youre due to have surgery. During surgery, youll be given medicine called anesthesia or anesthetic. This will keep you comfortable and pain-free. Your anesthesia provider will use general anesthesia.  What is general anesthesia?  General anesthesia puts you into a state like deep sleep. It goes into the bloodstream (IV anesthetics), into the lungs (gas anesthetics), or both. You feel nothing during the procedure. You will not remember it. During the procedure, the anesthesia provider monitors you continuously. He or she checks your heart rate and rhythm, blood pressure, breathing, and blood oxygen.  · IV anesthetics. IV anesthetics are given through an IV line in your arm. Theyre often given first. This is so you are asleep before a gas anesthetic is started. Some kinds of IV anesthetics relieve pain. Others relax you. Your doctor will decide which kind is best in your case.  · Gas anesthetics. Gas anesthetics are breathed into the lungs. They are often used to keep you asleep.  They can be given through a facemask or a tube placed in your larynx or trachea (breathing tube).  ? If you have a facemask, your anesthesia provider will most likely place it over your nose and mouth while youre still awake. Youll breathe oxygen through the mask as your IV anesthetic is started. Gas anesthetic may be added through the mask.  ? If you have a tube in the larynx or trachea, it will be inserted into your throat after youre asleep.  Anesthesia tools and medicines  You will likely have:  · IV anesthetics. These are put into an IV line into your bloodstream.  · Gas anesthetics. You breathe these anesthetics into your lungs, where they pass into your bloodstream.  · Pulse oximeter. This is a small clip that is attached to the end of your finger. This measures your blood oxygen level.  · Electrocardiography leads (electrodes). These are small sticky pads that are placed on your chest. They record your heart rate and rhythm.  · Blood pressure cuff. This reads your blood pressure.  Risks and possible complications  General anesthesia has some risks. These include:  · Breathing problems  · Nausea and vomiting  · Sore throat or hoarseness (usually temporary)  · Allergic reaction to the anesthetic  · Irregular heartbeat (rare)  · Cardiac arrest (rare)   Anesthesia safety  · Follow all instructions you are given for how long not to eat or drink before your procedure.  · Be sure your doctor knows what medicines and drugs you take. This includes over-the-counter medicines, herbs, supplements, alcohol or other drugs. You will be asked when those were last taken.  · Have an adult family member or friend drive you home after the procedure.  · For the first 24 hours after your surgery:  ? Do not drive or use heavy equipment.  ? Do not make important decisions or sign legal documents. If important decisions or signing legal documents is necessary during the first 24 hours after surgery, have a trusted family member  or spouse act on your behalf.  ? Avoid alcohol.  ? Have a responsible adult stay with you. He or she can watch for problems and help keep you safe.  Date Last Reviewed: 12/1/2016  © 5385-2064 [employer]. [address] [place] [address], [place] [address]. All rights reserved. This information is not intended as a substitute for professional medical care. Always follow your healthcare professional's instructions

## 2018-06-18 NOTE — PATIENT INSTRUCTIONS
PreOp Instructions given:    -- Medication information (what to hold and what to take)   -- NPO guidelines -- DO NOT EAT ANYTHING AFTER MIDNIGHT, INCLUDING GUM, HARD CANDY, MINTS, OR CHEWING TOBACCO, unless otherwise instructed by surgeon.  -- Arrival place and directions given; time to be given the day before procedure by the Surgeon's Office   -- Bathing with antibacterial soap   -- Don't wear any jewelry or bring any valuables AM of surgery   -- No makeup or moisturizer to face   -- No perfume/cologne, powder, lotions or aftershave     Pt and family member verbalized understanding.

## 2018-06-18 NOTE — PROGRESS NOTES
Margarito Krishna - Pre Op Consult  Progress Note    Patient Name: Valdemar Armstrong  MRN: [id number]  Date of Evaluation- 06/18/2018  PCP- Primary Doctor No    Future cases for Valdemar Armstrong [[id number]]     Case ID Status Date Time Chau Procedure Provider Location    [id number] [facility] 6/26/2018  7:00  RESECTION, COLON, LOW ANTERIOR Jam Mancia MD [[id number]] [facility] OR 2ND FLR          HPI:  History of present illness- I had the pleasure of meeting this pleasant 62 y.o. lady in the pre op clinic prior to his elective Abdominal surgery. The patient is new to me . Valdemar was accompanied by sister Parul.    I have obtained the history by speaking to the patient and by reviewing the electronic health records.    Events leading up to surgery / History of presenting illness -    Cancer of descending colon metastatic to intra-abdominal lymph node        Relevant health conditions of significance for the perioperative period/ History of presenting illness -        Patient Active Problem List    Diagnosis Date Noted    Essential hypertension 06/18/2018     Lisinopril nightly   Checks BP at the Gym -150-170/ 104- 87       Peripheral vascular disease-CT from June 2018 -Occlusion of the right subclavian artery near its bifurcation 06/18/2018     CT from June 2018   Occlusion of the right subclavian artery near its bifurcation with distal reconstitution and irregular atherosclerotic plaque throughout its course.  Records low BP on the Rt upper extremity       Class 1 obesity in adult 06/18/2018     Gained about 40 pounds with steroids during chemo , reduced physical activity from feeling unwell with chemo   Not known to have heart disease , Diabetes Mellitus,fatty liver        Renal impairment 06/18/2018     Ct from June 2018     The right kidney is normal in size and location.  There is severe left hydroureteronephrosis with cortical thinning of the left kidney      Throat cancer 06/18/2018     1983   Had surgery , radiation   1 year  later had to have axillary lymph glad surgery from cancer   No dysphagia      History of successful cardiopulmonary resuscitation 06/18/2018 1980's with skull surgery   To his understanding related to excessive sedation   Prone position surgery       Snoring 06/18/2018    Atypical chest pain 06/18/2018     Reproducible , by the shoulder       Metastatic colon cancer to liver 06/14/2018     In Feb 2013 he had a perforated colon cancer and underwent surgery which required a colostomy.    He was treated with 8 months of chemo and then had his ostomy reversed in Oct 2013 ( Had a lengthy surgery of 18 hours )     In 2014 - had multiple skin cancers , he attributes to cancer chemo therapy      In June 2017 he had a recurrence which lead to a colon stent being placed.      He developed a SBO in Nov 2017 ( to his understanding , mass effect of the tumor ) and underwent a small bowel resection which was complicated by a wound infection requiring a wound vac.      In Dec 2017 a CT chest showed some new sub cm lung nodules.  On a PET scan there was a liver lesion and the lesion in the colon, no other disease seen. He was then treated with chemo again in March and finished 5/24/18      Multiple pulmonary nodules determined by computed tomography of lung 06/14/2018     Unknown etiology as of 6/14/18.     CT June 2018 -Multiple bilateral pulmonary nodules measuring up to 0.6 cm, stable when compared to prior study dated 02/08/2018.      Cancer of descending colon metastatic to intra-abdominal lymph node 01/31/2018         Subjective/ Objective:          Chief complaint-Preoperative evaluation, Perioperative Medical management, complication reduction plan     Active cardiac conditions- none    Revised cardiac risk index predictors- high-risk type of surgery    Functional capacity -Examples of physical activity , can do what  he wants, goes to gym 3-4 days a week, does weights ( 30-40 pounds ), treadmill, , swims laps,  hiking ,  can take 1 flight of stairs----- He can undertake all the above activities without  chest pain,chest tightness ,dizziness,lightheadedness making his exercise tolerance more, less  than 4 Mets.   Winded on exertion, attributes to weight gain, deconditioning   CV risks- HTN,HLD  Not known to have Diabetes , no active smoking , No family history of heart disease     Review of Systems   Constitutional: Negative for chills and fever.          Weight gain   HENT:        STOPBANG score  5/ 8     Snoring   HTN  Age over 50 years  Neck size over 40 CM  Male gender    Has a history suggestive of apnea while on opioids    Eyes:        Visual changes  Since chemo   Respiratory:        No cough , phlegm  No Hemoptysis   Cardiovascular:        As noted   Gastrointestinal:        Bowels- loose  No overt GI/ blood losses   Endocrine:        Prednisone use > 20 mg daily for 3 weeks- None   Genitourinary: Negative for dysuria.        No urinary hesitancy    Musculoskeletal:        Joint pains    Skin: Negative for rash.   Neurological: Negative for syncope.        No unilateral weakness   Hematological:        Current use of Anticoagulants  Antiplatelet agents  None   Psychiatric/Behavioral:          No SI/HI     No vascular stenting     No past medical history pertinent negatives.  No family history on file.  Past Surgical History:   Procedure Laterality Date    COLON SURGERY  2015    SMALL INTESTINE SURGERY  2017       No  bleeding, cardiac problems, PONV with previous surgeries/procedures.  Medications and Allergies reviewed in epic.   FH- No anesthesia,bleeding  thrombosis , early onset heart disease in family   Currently in [place], staying with sister at this time     Physical Exam   HENT:   Head: Normocephalic.       Physical Exam   HENT:   Head: Normocephalic.     Constitutional- Vitals - Body mass index is 31.28 kg/m².,   Vitals:    06/18/18 1055   BP: 94/75   Pulse: 74   Temp: 97.7 °F (36.5 °C)     General  "appearance-Conscious,Coherent  Eyes- No conjunctival icterus,pupils  round  and reactive to light   ENT-Oral cavity- moist  , Hearing grossly normal   Neck- No thyromegaly ,Trachea -central, No jugular venous distension,   No Carotid Bruit   Cardiovascular -Heart Sounds- Normal  and  no murmur   , No gallop rhythm   Respiratory - Normal Respiratory Effort, Normal breath sounds, crepitations bases,  no wheeze  and  no forced expiratory wheeze    Peripheral pitting pedal edema-- none , no calf pain   Gastrointestinal -Soft abdomen, No palpable masses, Non Tender,Liver,Spleen not palpable. No-- free fluid and shifting dullness   Left mid abdomen discomfort- no rebound   Musculoskeletal- No finger Clubbing. Strength grossly normal   Lymphatic-No Palpable cervical, axillary,Inguinal lymphadenopathy   Psychiatric - normal effect,Orientation  Rt Dorsalis pedis pulses-palpable    Lt Dorsalis pedis pulses- palpable   Rt Posterior tibial pulses -palpable   Left posterior tibial pulses -palpable   Miscellaneous -  no asterixis,  no dupuytren's contracture,  Surgical scarabdomen   and  no renal bruit  Blood pressure 94/75, pulse 74, temperature 97.7 °F (36.5 °C), temperature source Oral, height 5' 10" (1.778 m), weight 98.9 kg (218 lb), SpO2 97 %.      Investigations  Lab and Imaging have been reviewed in [facility].    Review of Medicine tests    EKG- I had independently reviewed the EKG from--today   Pending reporting   Review of clinical lab tests:  Lab Results   Component Value Date    HGB 12.4 (L) 06/18/2018     06/18/2018           Review of old records- Was done and information gathered regards to events leading to surgery and health conditions of significance in the perioperative period.        Preoperative cardiac risk assessment-  The patient does not have any active cardiac conditions . Revised cardiac risk index predictors-1 ---.Functional capacity is more than 4 Mets. He will be undergoing a open abdominal " procedure that carries a high risk     The estimated risk of the rate of adverse cardiac outcomes  0.9%    No further cardiac work up is indicated prior to proceeding with the surgery          American Society of Anesthesiologists Physical status classification ( ASA ) class: 3     Postoperative pulmonary complication risk assessment:     ARISCAT ( Canet) risk index- risk class -  Intermediate- Incision assumed to involve upper abdomen      Tom Respiratory failure index- percentage risk of respiratory failure: 1.8 %     Assessment/Plan:     Metastatic colon cancer to liver  Planned for Resection of colon , Liver lobectomy and possible intra peritoneal chemotherapy     CT from June showed-  soft tissue mass arising from the sigmoid colon which abuts the left common and internal iliac arteries   From what he describes , he is having vascular evaluation     Steroid treatment with chemo- I suggest monitoring the patient for any suggestions of adrenal insufficiency in view of the recent steroid use        Essential hypertension  Hypertension-  Blood pressure is acceptable . I suggest continuation of Lisinopril ( nightly ) - during the entire perioperative period. I suggestblood pressure, electrolyte and renal function monitoring .I suggest addressing pain control as uncontrolled pain can increased blood pressure     Suggest going by BP reading on the Left upper extremity   Manual  / 88- LUE    Peripheral vascular disease-CT from June 2018 -Occlusion of the right subclavian artery near its bifurcation  Has Rt shoulder and by the chest discomfort for ling time   Reproducible by rotating the Rt shoulder , lifting weights, on bench press   Symptoms - not suggestive of Peripheral vascular disease     Class 1 obesity in adult  Encouraged weight loss    CKD (chronic kidney disease) stage 3, GFR 30-59 ml/min  Creatinine -6/14/2018- 1.1     I  suggest monitoring renal function, in put and out put status  mickey-operatively. I  suggest avoiding nephrotoxic medication including NSAIDs, COX2 inhibitors, intravenous contrast agent,avoiding hypotension to prevent further renal impairment.   Risk of Acute kidney injury discussed  NSAID effects discussed     Renal protection  Measures -Hydration , NSAID avoidance discussed  Care with high potassium diets suggested      Throat cancer   I suggest that the perioperative team be aware of this      History of successful cardiopulmonary resuscitation  I suggest that the perioperative team be aware of this     Snoring    Possible sleep apnea- I suggested a sleep study and suggest caution with usage of medication that can cause respiratory suppression in the perioperative period  potential ramifications of untreated sleep apnea, which could include daytime sleepiness, hypertension, heart disease and stroke were discussed  avoidance supine sleep, weight gain and alcoholic beverages since all of these can worsen FLORENCIA     Suggest Care with opioid treatment as, he had problems in the past on 2 sep[rated occasions      Atypical chest pain  Rt shoulder area         Preventive perioperative care    Thromboembolic prophylaxis:  His risk factors for thrombosis include cancer, obesity, surgical procedure and age.I suggest  thromboembolic prophylaxis ( mechanical/pharmacological, weighing the risk benefits of pharmacological agent use considering mickey procedural bleeding )  during the perioperative period.I suggested being active in the post operative period.      Postoperative pulmonary complication prophylaxis-Risk factors for post operative pulmonary complications include surgery lasting over 3 hours, ASA class >2 and proximity of the surgical site to the lungs- I suggest incentive spirometry use, early ambulation, end tidal carbon dioxide monitoring and pain control so as to avoid diaphragmatic splinting  , oral care , head end of bed elevation     Renal complication prophylaxis-Risk  factors for renal complications include pre-existing renal disease, hypertension and vascular disease . I suggest keeping him well hydrated and avoidance/ minimizing the use of  NSAID's,VILLANUEVA 2 Inhibitors ,IV contrast if possible in the perioperative period.I suggested drinking 2 litre's of water a day      Surgical site Infection Prophylaxis-I  suggest appropriate antibiotic for Prophylaxis against Surgical site infections    This visit was focused on Preoperative evaluation, Perioperative Medical management, complication reduction plans. I suggest that the patient follows up with primary care or relevant sub specialists for ongoing health care.    I appreciate the opportunity to be involved in this patients care. Please feel free to contact me if there were any questions about this consultation.    Patient is optimized     Patient was instructed to call and update me about any changes to health, changes to medication, office visits ,testing out side of the mickey operative care center , hospitalizations between now and surgery     Xiomara Li MD  Perioperative Medicine  [facility]   Pager [phone]  ---  6/18- 18 08      Ekg report from 6/18   Normal sinus rhythm  Normal ECG  No previous ECGs available  CMP- creatinine 1.4   Prealbumin - N   Called to discuss labs  Spoke to him   Old records reviewed   12/3/2017- Creat 1.4   ---  6/18- 18 19     CKD 3 discussed   Hydration - 2 litre'ss of water a day through the day   ---  6/25- 14 53     Chart review suggests that surgery is being cancelled   Called to follow up   Left a message to call, if needed   Follow suggested  up with primary care , oncologist

## 2018-06-18 NOTE — OUTPATIENT SUBJECTIVE & OBJECTIVE
Outpatient Subjective & Objective     Chief complaint-Preoperative evaluation, Perioperative Medical management, complication reduction plan     Active cardiac conditions- none    Revised cardiac risk index predictors- high-risk type of surgery    Functional capacity -Examples of physical activity , can do what  he wants, goes to gym 3-4 days a week, does weights ( 30-40 pounds ), treadmill, , swims laps, hiking ,  can take 1 flight of stairs----- He can undertake all the above activities without  chest pain,chest tightness ,dizziness,lightheadedness making his exercise tolerance more, less  than 4 Mets.   Winded on exertion, attributes to weight gain, deconditioning   CV risks- HTN,HLD  Not known to have Diabetes , no active smoking , No family history of heart disease     Review of Systems   Constitutional: Negative for chills and fever.          Weight gain   HENT:        STOPBANG score  5/ 8     Snoring   HTN  Age over 50 years  Neck size over 40 CM  Male gender    Has a history suggestive of apnea while on opioids    Eyes:        Visual changes  Since chemo   Respiratory:        No cough , phlegm  No Hemoptysis   Cardiovascular:        As noted   Gastrointestinal:        Bowels- loose  No overt GI/ blood losses   Endocrine:        Prednisone use > 20 mg daily for 3 weeks- None   Genitourinary: Negative for dysuria.        No urinary hesitancy    Musculoskeletal:        Joint pains    Skin: Negative for rash.   Neurological: Negative for syncope.        No unilateral weakness   Hematological:        Current use of Anticoagulants  Antiplatelet agents  None   Psychiatric/Behavioral:          No SI/HI     No vascular stenting     No past medical history pertinent negatives.  No family history on file.  Past Surgical History:   Procedure Laterality Date    COLON SURGERY  2015    SMALL INTESTINE SURGERY  2017       No  bleeding, cardiac problems, PONV with previous surgeries/procedures.  Medications and Allergies  "reviewed in epic.   FH- No anesthesia,bleeding  thrombosis , early onset heart disease in family   Currently in [place], staying with sister at this time     Physical Exam   HENT:   Head: Normocephalic.       Physical Exam   HENT:   Head: Normocephalic.     Constitutional- Vitals - Body mass index is 31.28 kg/m².,   Vitals:    06/18/18 1055   BP: 94/75   Pulse: 74   Temp: 97.7 °F (36.5 °C)     General appearance-Conscious,Coherent  Eyes- No conjunctival icterus,pupils  round  and reactive to light   ENT-Oral cavity- moist  , Hearing grossly normal   Neck- No thyromegaly ,Trachea -central, No jugular venous distension,   No Carotid Bruit   Cardiovascular -Heart Sounds- Normal  and  no murmur   , No gallop rhythm   Respiratory - Normal Respiratory Effort, Normal breath sounds, crepitations bases,  no wheeze  and  no forced expiratory wheeze    Peripheral pitting pedal edema-- none , no calf pain   Gastrointestinal -Soft abdomen, No palpable masses, Non Tender,Liver,Spleen not palpable. No-- free fluid and shifting dullness   Left mid abdomen discomfort- no rebound   Musculoskeletal- No finger Clubbing. Strength grossly normal   Lymphatic-No Palpable cervical, axillary,Inguinal lymphadenopathy   Psychiatric - normal effect,Orientation  Rt Dorsalis pedis pulses-palpable    Lt Dorsalis pedis pulses- palpable   Rt Posterior tibial pulses -palpable   Left posterior tibial pulses -palpable   Miscellaneous -  no asterixis,  no dupuytren's contracture,  Surgical scarabdomen   and  no renal bruit  Blood pressure 94/75, pulse 74, temperature 97.7 °F (36.5 °C), temperature source Oral, height 5' 10" (1.778 m), weight 98.9 kg (218 lb), SpO2 97 %.      Investigations  Lab and Imaging have been reviewed in [facility].    Review of Medicine tests    EKG- I had independently reviewed the EKG from--today   Pending reporting   Review of clinical lab tests:  Lab Results   Component Value Date    HGB 12.4 (L) 06/18/2018     06/18/2018 "           Review of old records- Was done and information gathered regards to events leading to surgery and health conditions of significance in the perioperative period.    Outpatient Subjective & Objective

## 2018-06-19 ENCOUNTER — TELEPHONE (OUTPATIENT)
Dept: SURGERY | Facility: CLINIC | Age: 63
End: 2018-06-19

## 2018-06-19 NOTE — TELEPHONE ENCOUNTER
----- Message from Navdeep Holley sent at 6/19/2018  2:52 PM CDT -----  Contact: Pt:[phone]  .Needs Advice    Reason for call: Reason for call: Pt states he was told to call the office back had cardiovascular not called him. Pt states cardiovascular did not call him.Pt missed a call from the nurse and would like to speak with the nurse.    Communication Preference:Pt telephone  Additional Information

## 2018-06-22 ENCOUNTER — INITIAL CONSULT (OUTPATIENT)
Dept: HEMATOLOGY/ONCOLOGY | Facility: CLINIC | Age: 63
End: 2018-06-22
Payer: MEDICARE

## 2018-06-22 VITALS
HEART RATE: 66 BPM | DIASTOLIC BLOOD PRESSURE: 73 MMHG | OXYGEN SATURATION: 99 % | TEMPERATURE: 98 F | HEIGHT: 70 IN | RESPIRATION RATE: 18 BRPM | WEIGHT: 220 LBS | SYSTOLIC BLOOD PRESSURE: 126 MMHG | BODY MASS INDEX: 31.5 KG/M2

## 2018-06-22 DIAGNOSIS — C18.9 METASTATIC COLON CANCER TO LIVER: ICD-10-CM

## 2018-06-22 DIAGNOSIS — C77.2 CANCER OF DESCENDING COLON METASTATIC TO INTRA-ABDOMINAL LYMPH NODE: Primary | ICD-10-CM

## 2018-06-22 DIAGNOSIS — C18.6 CANCER OF DESCENDING COLON METASTATIC TO INTRA-ABDOMINAL LYMPH NODE: Primary | ICD-10-CM

## 2018-06-22 DIAGNOSIS — C78.7 METASTATIC COLON CANCER TO LIVER: ICD-10-CM

## 2018-06-22 PROCEDURE — 99999 PR PBB SHADOW E&M-EST. PATIENT-LVL III: CPT | Mod: PBBFAC,,, | Performed by: INTERNAL MEDICINE

## 2018-06-22 PROCEDURE — 99213 OFFICE O/P EST LOW 20 MIN: CPT | Mod: PBBFAC | Performed by: INTERNAL MEDICINE

## 2018-06-22 PROCEDURE — 99205 OFFICE O/P NEW HI 60 MIN: CPT | Mod: S$PBB,,, | Performed by: INTERNAL MEDICINE

## 2018-06-22 NOTE — Clinical Note
González Hankins, Can you please look at his CT and let me know if you can do liver biopsy. He is going back to [place] in first week of July so hoping to get it before he leaves Thank you Lenka

## 2018-06-22 NOTE — LETTER
June 22, 2018      Jam Mancia MD  [address]  [place] [facility] [address]           [facility]  [address]  [facility] [address]  Phone: [phone]          Patient: Valdemar Armstrong   MR Number: [id number]   YOB: 1955   Date of Visit: 6/22/2018       Dear Dr. Jam Mancia:    Thank you for referring Valdemar Armstrong to me for evaluation. Attached you will find relevant portions of my assessment and plan of care.    If you have questions, please do not hesitate to call me. I look forward to following Valdemar Armstrong along with you.    Sincerely,    Lenka Owens MD    Enclosure  CC:  No Recipients    If you would like to receive this communication electronically, please contact [email or web address] or [phone] to request more information on [employer] access.    For providers and/or their staff who would like to refer a patient to [facility], please contact us through our one-stop-shop provider referral line, [facility], at [phone].    If you feel you have received this communication in error or would no longer like to receive these types of communications, please e-mail [email or web address]

## 2018-06-22 NOTE — PROGRESS NOTES
REASON FOR VISIT:  New diagnosis of gastric cancer.    REFERRING PHYSICIANS:  Dr. Mancia and Dr. Baez with Surgery.    HISTORY OF PRESENT ILLNESS:  Mr. Armstrong is a 62-year-old male with a history of   lymphoma treated in 1980, throat cancer as well in 1980, skin cancer in 2014,   colon cancer initially diagnosed in 2013 when he presented with a perforated   colon cancer and underwent surgery which required a colostomy.  He was treated   with chemo for eight months and then had his colostomy reversed in October 2013.    He did well until June 2017 when he had a recurrence, for which he was thought   to be unresectable and a colonic stent was placed.  He developed a small bowel   obstruction in November 2017 and underwent a small bowel resection, which was   complicated by wound infection requiring a wound VAC.  In December 2017, a chest   CT showed some new subcentimeter lung nodules.  On a PET scan, he was noted to   have a liver lesion and lesion in the colon.  No other disease was noted.  It   looks like he saw Dr. Valdes briefly and the consensus was to proceed with some   chemotherapy and he received about four cycles of XELOX up until 05/24/2008.  He   comes in to discuss further management.  He underwent CT scan on 06/14/2018   here, which reveals multiple pulmonary nodules bilaterally with the largest on   the right located in the right middle lobe measuring 0.5 cm and the largest on   the left in the left lower lobe measuring 0.6 cm.  The liver is normal in size   and attenuation, numerous scattered hepatic hypodensities measuring up to 5.1   cm, which are grossly stable in size, many of which demonstrate consistency with   simple fluid and others too small to characterize.  These do not demonstrate   hypermetabolism on prior PET CT studies, hypodensity in the right hepatic   lobe dome near the dome of the liver, which is barely perceptible on today's   study.  No new enhancing lesions are identified.  He  has seen Dr. Baez with   Surgical Oncology who felt that if the colon cancer can be resected, he would   offer a meaningful resection to the liver lesions.  He has seen Dr. Mancia who   has discussed with him about an extensive surgery that would include a left   nephrectomy and possible involvement of the left iliac artery, which might   require a partial resection.  It appears that they feel like this resection is   not going to be a curative resection.  The patient comes in to discuss further   management.  He understands that there is a pretty good chance that the surgery   is not going to be curable and he wants to discuss other options at this time.    He is accompanied to the clinic today with his sister.  He essentially is doing   very well right now and he is off of the TPN.  He is able to eat, he is   exercising and he has gained a significant amount of weight in the last few   months.  ECOG performance status is 0.    REVIEW OF SYSTEMS:  GENERAL:  Denies any appetite change, fatigue or unexpected weight change.  HEENT:  Negative for mouth sores.  EYES:  Negative visual disturbance.  RESPIRATORY:  Negative for cough and shortness of breath.  CARDIOVASCULAR:  Negative for chest pain.  GASTROINTESTINAL:  Negative for diarrhea.  GENITOURINARY:  Negative for frequency.  MUSCULOSKELETAL:  Negative for back pain.  SKIN:  Negative for rash.  NEUROLOGIC:  Negative for headache.  HEMATOLOGIC:  Negative for adenopathy.  PSYCHIATRIC AND BEHAVIORAL:  He is not nervous or anxious.    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    PAST SURGICAL HISTORY:  Small intestine surgery, colon surgery,     FAMILY HISTORY:  Father  with lung cancer.  Mother  with   Alzheimer's.  Sister with skin cancer.    SOCIAL HISTORY:  Never smoked, does not drink alcohol, but a significant passive    smoking exposure from his father.    PHYSICAL EXAMINATION:  GENERAL:  The patient is oriented to person, place and time, appears well   developed, well nourished, in no acute distress.  HEENT:  Right and left ears are normal.  Oropharynx is clear and moist, no   oropharyngeal exudate.  Teeth, gums and lips are normal.  No sinus tenderness.    Palate, tongue and posterior pharynx are normal.  EYES:  Conjunctivae and lids are normal.  NECK:  Supple.  No JVD.  No thyromegaly.  CARDIOVASCULAR:  Normal rate, regular rhythm.  Normal heart sounds.  Intact   distal pulses and normal pulses, no edema or tenderness in the extremities.  PULMONARY AND CHEST:  Bilateral equal breath sounds heard, no rales, rhonchi or   wheezes.  ABDOMEN:  Soft, nontender and nondistended.  No hepatomegaly or splenomegaly.  MUSCULOSKELETAL:  Normal range of motion.  Gait is normal.  No clubbing or   cyanosis.  LYMPHADENOPATHY:  No submental, submandibular, cervical or supraclavicular lymph   nodes noted.  NEUROLOGIC:  Alert and oriented to person, place and time.  Normal strength,   normal reflexes.  No sensory deficit.  Gait is normal.  SKIN:  Warm, dry and intact.  No bruising, no lesions, no rashes.  PSYCHIATRIC:  Normal mood and affect.  Speech, behavior, judgment, thought   content, cognition and memory are normal.    LABORATORY DATA:  From 06/18/2018, reveals a CEA of 1.8.  CMP is within normal   limits.  CBC with a hemoglobin of 12.4, platelets and WBC are normal.    ASSESSMENT AND PLAN:  Valdemar Armstrong is a 62-year-old male with unresectable   colorectal cancer with liver mets as well as multiple nodules in the lung,   worrisome for lung metastasis.  I extensively discussed with him based on Dr. Mancia's recommendations that surgery would be done without a curative intent.    So, at this point, we have recommended him to proceed with chemotherapy.  Also,   we discussed briefly with IR and they think that it will be difficult to  biopsy   of the liver lesions.  So, I have attempted to contact the patient's primary   oncologist, Dr. Aman Barrios, in [place] with the phone #[phone] and I   will wait to hear back from him and about continuation of the chemo.  The   rationale for all of this was extensively discussed with the patient and his   accompanying sister and all questions were answered to their satisfaction.  They   will return back to me on an as needed basis.            /scarlett [id number] blank(s)        SPS/HN  dd: 06/22/2018 14:23:48 (CDT)  td: 06/22/2018 23:16:09 (CDT)  Doc ID   #[id number]  Job ID #[id number]    CC:        Dictated # [id number]

## 2018-06-24 ENCOUNTER — PATIENT MESSAGE (OUTPATIENT)
Dept: HEMATOLOGY/ONCOLOGY | Facility: CLINIC | Age: 63
End: 2018-06-24

## 2018-06-25 NOTE — TELEPHONE ENCOUNTER
He told me he is going back to [place] in the first week of July and I already spoke with his Oncologist Dr. Sams to continue his chemo.  He can easily change the chemo to help with tolerance

## 2018-06-26 ENCOUNTER — ANESTHESIA (OUTPATIENT)
Dept: SURGERY | Facility: HOSPITAL | Age: 63
End: 2018-06-26

## 2018-06-27 DIAGNOSIS — K76.9 LESION OF LIVER: Primary | ICD-10-CM

## 2018-06-28 ENCOUNTER — PATIENT MESSAGE (OUTPATIENT)
Dept: HEMATOLOGY/ONCOLOGY | Facility: CLINIC | Age: 63
End: 2018-06-28

## 2018-06-28 NOTE — TELEPHONE ENCOUNTER
Spoke with patient.  He is calling to state he thought the liver lesions were too small to biopsy. Nurse informed patient our radiologist felt they could get the biopsy.     Nurse explained to patient dr gupta could not make treatment decisions without seeing him in clinic often, as she will need to assess him before treatment. Nurse explained to patient his local oncologist is more than welcome to consult with dr gupta throughout his course of care.  Patient states his liver biopsy is scheduled post him leaving [place] to go back to [place], so he will talk to his family tonight---as he may just want to do the biopsy back in [place].    He will speak with family and contact nurse in the next couple days.    Message routed to dr gupta (just OLENA)

## 2018-06-29 NOTE — TELEPHONE ENCOUNTER
Please talk to me about this.  He is not receiving treatment with us.  And, Dr. Mancia prescribed xanax #90 last week.  ~eliana

## 2018-07-01 ENCOUNTER — PATIENT MESSAGE (OUTPATIENT)
Dept: SURGERY | Facility: CLINIC | Age: 63
End: 2018-07-01